# Patient Record
Sex: MALE | Race: WHITE | HISPANIC OR LATINO | URBAN - METROPOLITAN AREA
[De-identification: names, ages, dates, MRNs, and addresses within clinical notes are randomized per-mention and may not be internally consistent; named-entity substitution may affect disease eponyms.]

---

## 2021-12-22 ENCOUNTER — APPOINTMENT (RX ONLY)
Dept: URBAN - METROPOLITAN AREA CLINIC 15 | Facility: CLINIC | Age: 36
Setting detail: DERMATOLOGY
End: 2021-12-22

## 2021-12-22 VITALS — WEIGHT: 235 LBS | HEIGHT: 70 IN

## 2021-12-22 DIAGNOSIS — L57.8 OTHER SKIN CHANGES DUE TO CHRONIC EXPOSURE TO NONIONIZING RADIATION: ICD-10-CM

## 2021-12-22 DIAGNOSIS — L21.8 OTHER SEBORRHEIC DERMATITIS: ICD-10-CM | Status: WORSENING

## 2021-12-22 DIAGNOSIS — D22 MELANOCYTIC NEVI: ICD-10-CM

## 2021-12-22 PROBLEM — D22.4 MELANOCYTIC NEVI OF SCALP AND NECK: Status: ACTIVE | Noted: 2021-12-22

## 2021-12-22 PROBLEM — D22.0 MELANOCYTIC NEVI OF LIP: Status: ACTIVE | Noted: 2021-12-22

## 2021-12-22 PROCEDURE — ? SUNSCREEN RECOMMENDATIONS

## 2021-12-22 PROCEDURE — ? COUNSELING

## 2021-12-22 PROCEDURE — ? PRESCRIPTION MEDICATION MANAGEMENT

## 2021-12-22 PROCEDURE — ? PRESCRIPTION

## 2021-12-22 PROCEDURE — ? OBSERVATION

## 2021-12-22 PROCEDURE — 99204 OFFICE O/P NEW MOD 45 MIN: CPT

## 2021-12-22 RX ORDER — KETOCONAZOLE 20 MG/ML
SHAMPOO, SUSPENSION TOPICAL
Qty: 120 | Refills: 6 | Status: ERX | COMMUNITY
Start: 2021-12-22

## 2021-12-22 RX ORDER — DESONIDE 0.5 MG/ML
1 LOTION TOPICAL TIW
Qty: 118 | Refills: 3 | Status: ERX | COMMUNITY
Start: 2021-12-22

## 2021-12-22 RX ORDER — KETOCONAZOLE 20 MG/G
1 CREAM TOPICAL QHS
Qty: 60 | Refills: 2 | Status: ERX | COMMUNITY
Start: 2021-12-22

## 2021-12-22 RX ADMIN — KETOCONAZOLE: 20 SHAMPOO, SUSPENSION TOPICAL at 00:00

## 2021-12-22 RX ADMIN — DESONIDE 1: 0.5 LOTION TOPICAL at 00:00

## 2021-12-22 RX ADMIN — KETOCONAZOLE 1: 20 CREAM TOPICAL at 00:00

## 2021-12-22 ASSESSMENT — LOCATION DETAILED DESCRIPTION DERM
LOCATION DETAILED: RIGHT CLAVICULAR NECK
LOCATION DETAILED: LEFT CLAVICULAR NECK
LOCATION DETAILED: LEFT SUPERIOR LATERAL NECK
LOCATION DETAILED: RIGHT INFERIOR LATERAL MALAR CHEEK
LOCATION DETAILED: INFERIOR MID FOREHEAD
LOCATION DETAILED: LEFT LOWER CUTANEOUS LIP
LOCATION DETAILED: LEFT CENTRAL MALAR CHEEK

## 2021-12-22 ASSESSMENT — LOCATION SIMPLE DESCRIPTION DERM
LOCATION SIMPLE: RIGHT ANTERIOR NECK
LOCATION SIMPLE: INFERIOR FOREHEAD
LOCATION SIMPLE: LEFT LIP
LOCATION SIMPLE: LEFT CHEEK
LOCATION SIMPLE: LEFT ANTERIOR NECK
LOCATION SIMPLE: RIGHT CHEEK

## 2021-12-22 ASSESSMENT — LOCATION ZONE DERM
LOCATION ZONE: NECK
LOCATION ZONE: FACE
LOCATION ZONE: LIP

## 2021-12-22 NOTE — PROCEDURE: SUNSCREEN RECOMMENDATIONS
Detail Level: Zone
Products Recommended: .\\nRecommending Elta MD UV sunscreen daily ( Mineral: Zinc Oxide and Titanium Dioxide with SPF 30-50). Re apply every 3-4 hours daily. \\n.
General Sunscreen Counseling: I recommended a broad spectrum sunscreen with a SPF of 30 or higher. I explained that SPF 30 sunscreens block approximately 97 percent of the sun's harmful rays. Sunscreens should be applied at least 15 minutes prior to expected sun exposure and then every 2 hours after that as long as sun exposure continues. If swimming or exercising sunscreen should be reapplied every 45 minutes to an hour after getting wet or sweating. One ounce, or the equivalent of a shot glass full of sunscreen, is adequate to protect the skin not covered by a bathing suit. I also recommended a lip balm with a sunscreen as well. Sun protective clothing can be used in lieu of sunscreen but must be worn the entire time you are exposed to the sun's rays.

## 2021-12-22 NOTE — PROCEDURE: PRESCRIPTION MEDICATION MANAGEMENT
Detail Level: Zone
Render In Strict Bullet Format?: No
Initiate Treatment: .\\n\\nketoconazole 2 % shampoo Use 2 -3 x week. Apply to scalp and face leave on for 3 minutes then rinse, use condition after. \\n\\nketoconazole 2 % topical cream Apply a small amount to the face and ears daily at night. \\n\\ndesonide 0.05 % lotion Apply a small amount to affected areas on face Monday Wednesday and Friday morning.

## 2021-12-22 NOTE — HPI: SKIN LESIONS
Have Your Skin Lesions Been Treated?: not been treated
Is This A New Presentation, Or A Follow-Up?: Skin Lesion
How Severe Is Your Skin Lesion?: mild
Additional History: Patient is in office for red lesions throughout body.

## 2021-12-22 NOTE — HPI: DRY SKIN
How Severe Is Your Dry Skin?: mild
Additional History: Patient is in office for dryness behind ears, glabella area and scalp
How Many Showers Or Baths Do You Take In One Day?: 1

## 2022-06-13 ENCOUNTER — APPOINTMENT (RX ONLY)
Dept: URBAN - METROPOLITAN AREA CLINIC 15 | Facility: CLINIC | Age: 37
Setting detail: DERMATOLOGY
End: 2022-06-13

## 2022-06-13 VITALS — HEIGHT: 72 IN | WEIGHT: 220 LBS

## 2022-06-13 DIAGNOSIS — L21.8 OTHER SEBORRHEIC DERMATITIS: ICD-10-CM

## 2022-06-13 DIAGNOSIS — D18.0 HEMANGIOMA: ICD-10-CM

## 2022-06-13 DIAGNOSIS — L64.8 OTHER ANDROGENIC ALOPECIA: ICD-10-CM

## 2022-06-13 DIAGNOSIS — D22 MELANOCYTIC NEVI: ICD-10-CM

## 2022-06-13 PROBLEM — D22.39 MELANOCYTIC NEVI OF OTHER PARTS OF FACE: Status: ACTIVE | Noted: 2022-06-13

## 2022-06-13 PROBLEM — D18.01 HEMANGIOMA OF SKIN AND SUBCUTANEOUS TISSUE: Status: ACTIVE | Noted: 2022-06-13

## 2022-06-13 PROCEDURE — ? PRESCRIPTION MEDICATION MANAGEMENT

## 2022-06-13 PROCEDURE — ? PRESCRIPTION

## 2022-06-13 PROCEDURE — 99214 OFFICE O/P EST MOD 30 MIN: CPT

## 2022-06-13 PROCEDURE — ? COUNSELING

## 2022-06-13 RX ORDER — DESONIDE 0.5 MG/ML
1 LOTION TOPICAL TIW
Qty: 118 | Refills: 3 | Status: ERX

## 2022-06-13 RX ORDER — KETOCONAZOLE 20 MG/ML
SHAMPOO, SUSPENSION TOPICAL
Qty: 120 | Refills: 6 | Status: ERX

## 2022-06-13 RX ORDER — KETOCONAZOLE 20 MG/G
1 CREAM TOPICAL QHS
Qty: 60 | Refills: 2 | Status: ERX

## 2022-06-13 ASSESSMENT — LOCATION ZONE DERM
LOCATION ZONE: SCALP
LOCATION ZONE: LEG
LOCATION ZONE: TRUNK
LOCATION ZONE: FACE

## 2022-06-13 ASSESSMENT — LOCATION DETAILED DESCRIPTION DERM
LOCATION DETAILED: LEFT CENTRAL SUBMANDIBULAR CHEEK
LOCATION DETAILED: EPIGASTRIC SKIN
LOCATION DETAILED: LEFT CENTRAL FRONTAL SCALP
LOCATION DETAILED: RIGHT SUPERIOR FOREHEAD
LOCATION DETAILED: INFERIOR THORACIC SPINE
LOCATION DETAILED: LEFT ANTERIOR DISTAL THIGH

## 2022-06-13 ASSESSMENT — LOCATION SIMPLE DESCRIPTION DERM
LOCATION SIMPLE: UPPER BACK
LOCATION SIMPLE: LEFT THIGH
LOCATION SIMPLE: RIGHT FOREHEAD
LOCATION SIMPLE: ABDOMEN
LOCATION SIMPLE: LEFT CHEEK
LOCATION SIMPLE: LEFT SCALP

## 2022-06-13 NOTE — PROCEDURE: PRESCRIPTION MEDICATION MANAGEMENT
Detail Level: Zone
Render In Strict Bullet Format?: No
Initiate Treatment: .\\n\\nketoconazole 2 % shampoo Use 2 -3 x week. Apply to scalp and face leave on for 3 minutes then rinse, use condition after. \\n\\nketoconazole 2 % topical cream Apply a small amount to the face and ears daily at night. \\n\\ndesonide 0.05 % lotion Apply a small amount to affected areas on face Monday Wednesday and Friday morning.
Initiate Treatment: .\\n-ketoconazole 2% shampoo three times a week\\n-Rogaine 5% mens foam\\n\\n*Briefly discussed finasteride, declines at the moment*

## 2022-06-13 NOTE — HPI: FULL BODY SKIN EXAMINATION
How Severe Are Your Spot(S)?: mild
What Type Of Note Output Would You Prefer (Optional)?: Bullet Format
What Is The Reason For Today's Visit?: Full Body Skin Examination
What Is The Reason For Today's Visit? (Being Monitored For X): concerning skin lesions on an annual basis
Additional History: Patient is in office for a full body examination.

## 2022-12-21 ENCOUNTER — APPOINTMENT (RX ONLY)
Dept: URBAN - METROPOLITAN AREA CLINIC 15 | Facility: CLINIC | Age: 37
Setting detail: DERMATOLOGY
End: 2022-12-21

## 2022-12-21 VITALS — HEIGHT: 72 IN | WEIGHT: 220 LBS

## 2022-12-21 DIAGNOSIS — L21.8 OTHER SEBORRHEIC DERMATITIS: ICD-10-CM | Status: IMPROVED

## 2022-12-21 PROCEDURE — ? PRESCRIPTION

## 2022-12-21 PROCEDURE — 99214 OFFICE O/P EST MOD 30 MIN: CPT

## 2022-12-21 PROCEDURE — ? COUNSELING

## 2022-12-21 PROCEDURE — ? PRESCRIPTION MEDICATION MANAGEMENT

## 2022-12-21 RX ORDER — KETOCONAZOLE 20 MG/ML
SHAMPOO, SUSPENSION TOPICAL
Qty: 120 | Refills: 6 | Status: ERX

## 2022-12-21 RX ORDER — TACROLIMUS 1 MG/G
1 OINTMENT TOPICAL
Qty: 100 | Refills: 5 | Status: ERX | COMMUNITY
Start: 2022-12-21

## 2022-12-21 RX ORDER — KETOCONAZOLE 20 MG/G
1 CREAM TOPICAL QHS
Qty: 60 | Refills: 6 | Status: ERX

## 2022-12-21 RX ADMIN — TACROLIMUS 1: 1 OINTMENT TOPICAL at 00:00

## 2022-12-21 NOTE — PROCEDURE: PRESCRIPTION MEDICATION MANAGEMENT
Detail Level: Zone
Render In Strict Bullet Format?: No
Modify Regimen: .\\n\\nketoconazole 2 % shampoo Use 2 -3 x week. Apply to scalp and face leave on for 3 minutes then rinse, use condition after. \\n\\nketoconazole 2 % topical cream Apply a small amount to the face and ears daily at night. \\n\\nProtopic 0.1 % ointment - Apply a small amount to affected areas on face Monday Wednesday and Friday morning. *May take when traveling. *